# Patient Record
Sex: FEMALE | Race: WHITE | NOT HISPANIC OR LATINO | Employment: FULL TIME | URBAN - METROPOLITAN AREA
[De-identification: names, ages, dates, MRNs, and addresses within clinical notes are randomized per-mention and may not be internally consistent; named-entity substitution may affect disease eponyms.]

---

## 2019-04-03 ENCOUNTER — HOSPITAL ENCOUNTER (INPATIENT)
Facility: MEDICAL CENTER | Age: 39
LOS: 3 days | DRG: 833 | End: 2019-04-07
Attending: OBSTETRICS & GYNECOLOGY | Admitting: OBSTETRICS & GYNECOLOGY
Payer: COMMERCIAL

## 2019-04-03 LAB
ABO GROUP BLD: NORMAL
ABO GROUP BLD: NORMAL
ALBUMIN SERPL BCP-MCNC: 3.4 G/DL (ref 3.2–4.9)
ALBUMIN/GLOB SERPL: 1.3 G/DL
ALP SERPL-CCNC: 114 U/L (ref 30–99)
ALT SERPL-CCNC: 11 U/L (ref 2–50)
ANION GAP SERPL CALC-SCNC: 12 MMOL/L (ref 0–11.9)
AST SERPL-CCNC: 13 U/L (ref 12–45)
BASOPHILS # BLD AUTO: 0.3 % (ref 0–1.8)
BASOPHILS # BLD: 0.03 K/UL (ref 0–0.12)
BILIRUB SERPL-MCNC: 0.3 MG/DL (ref 0.1–1.5)
BLD GP AB INVEST PLASRBC-IMP: NORMAL
BLD GP AB SCN SERPL QL: NORMAL
BUN SERPL-MCNC: 9 MG/DL (ref 8–22)
CALCIUM SERPL-MCNC: 8 MG/DL (ref 8.5–10.5)
CHLORIDE SERPL-SCNC: 103 MMOL/L (ref 96–112)
CO2 SERPL-SCNC: 20 MMOL/L (ref 20–33)
COMPONENT R 8504R: NORMAL
COMPONENT R 8504R: NORMAL
CREAT SERPL-MCNC: 0.65 MG/DL (ref 0.5–1.4)
EOSINOPHIL # BLD AUTO: 0.03 K/UL (ref 0–0.51)
EOSINOPHIL NFR BLD: 0.3 % (ref 0–6.9)
ERYTHROCYTE [DISTWIDTH] IN BLOOD BY AUTOMATED COUNT: 42.7 FL (ref 35.9–50)
GLOBULIN SER CALC-MCNC: 2.6 G/DL (ref 1.9–3.5)
GLUCOSE SERPL-MCNC: 99 MG/DL (ref 65–99)
HCT VFR BLD AUTO: 39.7 % (ref 37–47)
HGB BLD-MCNC: 13.3 G/DL (ref 12–16)
IMM GRANULOCYTES # BLD AUTO: 0.04 K/UL (ref 0–0.11)
IMM GRANULOCYTES NFR BLD AUTO: 0.3 % (ref 0–0.9)
LYMPHOCYTES # BLD AUTO: 1.97 K/UL (ref 1–4.8)
LYMPHOCYTES NFR BLD: 16.4 % (ref 22–41)
MAGNESIUM SERPL-MCNC: 3.5 MG/DL (ref 1.5–2.5)
MCH RBC QN AUTO: 29 PG (ref 27–33)
MCHC RBC AUTO-ENTMCNC: 33.5 G/DL (ref 33.6–35)
MCV RBC AUTO: 86.7 FL (ref 81.4–97.8)
MONOCYTES # BLD AUTO: 0.63 K/UL (ref 0–0.85)
MONOCYTES NFR BLD AUTO: 5.3 % (ref 0–13.4)
NEUTROPHILS # BLD AUTO: 9.3 K/UL (ref 2–7.15)
NEUTROPHILS NFR BLD: 77.4 % (ref 44–72)
NRBC # BLD AUTO: 0 K/UL
NRBC BLD-RTO: 0 /100 WBC
PLATELET # BLD AUTO: 206 K/UL (ref 164–446)
PMV BLD AUTO: 10.1 FL (ref 9–12.9)
POTASSIUM SERPL-SCNC: 3.9 MMOL/L (ref 3.6–5.5)
PROT SERPL-MCNC: 6 G/DL (ref 6–8.2)
RBC # BLD AUTO: 4.58 M/UL (ref 4.2–5.4)
RH BLD: NORMAL
RH BLD: NORMAL
SODIUM SERPL-SCNC: 135 MMOL/L (ref 135–145)
WBC # BLD AUTO: 12 K/UL (ref 4.8–10.8)
XXX BLOOD GROUP AB TITR SERPL AHG: 4 {TITER}

## 2019-04-03 PROCEDURE — 86850 RBC ANTIBODY SCREEN: CPT

## 2019-04-03 PROCEDURE — 86870 RBC ANTIBODY IDENTIFICATION: CPT

## 2019-04-03 PROCEDURE — 86922 COMPATIBILITY TEST ANTIGLOB: CPT

## 2019-04-03 PROCEDURE — 700111 HCHG RX REV CODE 636 W/ 250 OVERRIDE (IP): Performed by: OBSTETRICS & GYNECOLOGY

## 2019-04-03 PROCEDURE — 86900 BLOOD TYPING SEROLOGIC ABO: CPT

## 2019-04-03 PROCEDURE — 700105 HCHG RX REV CODE 258: Performed by: OBSTETRICS & GYNECOLOGY

## 2019-04-03 PROCEDURE — 36415 COLL VENOUS BLD VENIPUNCTURE: CPT

## 2019-04-03 PROCEDURE — 86886 COOMBS TEST INDIRECT TITER: CPT

## 2019-04-03 PROCEDURE — 96375 TX/PRO/DX INJ NEW DRUG ADDON: CPT

## 2019-04-03 PROCEDURE — 86901 BLOOD TYPING SEROLOGIC RH(D): CPT

## 2019-04-03 PROCEDURE — 700111 HCHG RX REV CODE 636 W/ 250 OVERRIDE (IP)

## 2019-04-03 PROCEDURE — 96365 THER/PROPH/DIAG IV INF INIT: CPT

## 2019-04-03 PROCEDURE — 83735 ASSAY OF MAGNESIUM: CPT

## 2019-04-03 PROCEDURE — 85025 COMPLETE CBC W/AUTO DIFF WBC: CPT

## 2019-04-03 PROCEDURE — 700105 HCHG RX REV CODE 258

## 2019-04-03 PROCEDURE — 96372 THER/PROPH/DIAG INJ SC/IM: CPT

## 2019-04-03 PROCEDURE — 80053 COMPREHEN METABOLIC PANEL: CPT

## 2019-04-03 RX ORDER — VITAMIN A ACETATE, BETA CAROTENE, ASCORBIC ACID, CHOLECALCIFEROL, .ALPHA.-TOCOPHEROL ACETATE, DL-, THIAMINE MONONITRATE, RIBOFLAVIN, NIACINAMIDE, PYRIDOXINE HYDROCHLORIDE, FOLIC ACID, CYANOCOBALAMIN, CALCIUM CARBONATE, FERROUS FUMARATE, ZINC OXIDE, CUPRIC OXIDE 3080; 12; 120; 400; 1; 1.84; 3; 20; 22; 920; 25; 200; 27; 10; 2 [IU]/1; UG/1; MG/1; [IU]/1; MG/1; MG/1; MG/1; MG/1; MG/1; [IU]/1; MG/1; MG/1; MG/1; MG/1; MG/1
1 TABLET, FILM COATED ORAL DAILY
Status: DISCONTINUED | OUTPATIENT
Start: 2019-04-04 | End: 2019-04-07 | Stop reason: HOSPADM

## 2019-04-03 RX ORDER — SODIUM CHLORIDE, SODIUM LACTATE, POTASSIUM CHLORIDE, CALCIUM CHLORIDE 600; 310; 30; 20 MG/100ML; MG/100ML; MG/100ML; MG/100ML
INJECTION, SOLUTION INTRAVENOUS CONTINUOUS
Status: DISCONTINUED | OUTPATIENT
Start: 2019-04-03 | End: 2019-04-07 | Stop reason: HOSPADM

## 2019-04-03 RX ORDER — DOCUSATE SODIUM 100 MG/1
100 CAPSULE, LIQUID FILLED ORAL 2 TIMES DAILY
Status: DISCONTINUED | OUTPATIENT
Start: 2019-04-03 | End: 2019-04-07 | Stop reason: HOSPADM

## 2019-04-03 RX ORDER — BETAMETHASONE SODIUM PHOSPHATE AND BETAMETHASONE ACETATE 3; 3 MG/ML; MG/ML
12 INJECTION, SUSPENSION INTRA-ARTICULAR; INTRALESIONAL; INTRAMUSCULAR; SOFT TISSUE EVERY 24 HOURS
Status: COMPLETED | OUTPATIENT
Start: 2019-04-03 | End: 2019-04-04

## 2019-04-03 RX ORDER — MAGNESIUM SULFATE HEPTAHYDRATE 40 MG/ML
2 INJECTION, SOLUTION INTRAVENOUS CONTINUOUS
Status: DISCONTINUED | OUTPATIENT
Start: 2019-04-03 | End: 2019-04-05

## 2019-04-03 RX ORDER — SODIUM CHLORIDE, SODIUM LACTATE, POTASSIUM CHLORIDE, CALCIUM CHLORIDE 600; 310; 30; 20 MG/100ML; MG/100ML; MG/100ML; MG/100ML
INJECTION, SOLUTION INTRAVENOUS
Status: COMPLETED
Start: 2019-04-03 | End: 2019-04-03

## 2019-04-03 RX ORDER — MAGNESIUM SULFATE HEPTAHYDRATE 40 MG/ML
INJECTION, SOLUTION INTRAVENOUS
Status: COMPLETED
Start: 2019-04-03 | End: 2019-04-03

## 2019-04-03 RX ADMIN — MAGNESIUM SULFATE IN WATER 3 G/HR: 40 INJECTION, SOLUTION INTRAVENOUS at 20:00

## 2019-04-03 RX ADMIN — MAGNESIUM SULFATE HEPTAHYDRATE 3 G/HR: 40 INJECTION, SOLUTION INTRAVENOUS at 20:00

## 2019-04-03 RX ADMIN — SODIUM CHLORIDE 2.5 MILLION UNITS: 9 INJECTION, SOLUTION INTRAVENOUS at 21:38

## 2019-04-03 RX ADMIN — SODIUM CHLORIDE, POTASSIUM CHLORIDE, SODIUM LACTATE AND CALCIUM CHLORIDE: 600; 310; 30; 20 INJECTION, SOLUTION INTRAVENOUS at 20:00

## 2019-04-03 RX ADMIN — BETAMETHASONE SODIUM PHOSPHATE AND BETAMETHASONE ACETATE 12 MG: 3; 3 INJECTION, SUSPENSION INTRA-ARTICULAR; INTRALESIONAL; INTRAMUSCULAR at 21:17

## 2019-04-03 ASSESSMENT — PATIENT HEALTH QUESTIONNAIRE - PHQ9
SUM OF ALL RESPONSES TO PHQ9 QUESTIONS 1 AND 2: 0
2. FEELING DOWN, DEPRESSED, IRRITABLE, OR HOPELESS: NOT AT ALL
1. LITTLE INTEREST OR PLEASURE IN DOING THINGS: NOT AT ALL

## 2019-04-03 ASSESSMENT — LIFESTYLE VARIABLES: EVER_SMOKED: NEVER

## 2019-04-04 LAB
MAGNESIUM SERPL-MCNC: 5.5 MG/DL (ref 1.5–2.5)
MAGNESIUM SERPL-MCNC: 5.8 MG/DL (ref 1.5–2.5)
MAGNESIUM SERPL-MCNC: 6.6 MG/DL (ref 1.5–2.5)
MAGNESIUM SERPL-MCNC: 6.8 MG/DL (ref 1.5–2.5)

## 2019-04-04 PROCEDURE — 700105 HCHG RX REV CODE 258: Performed by: OBSTETRICS & GYNECOLOGY

## 2019-04-04 PROCEDURE — A9270 NON-COVERED ITEM OR SERVICE: HCPCS | Performed by: OBSTETRICS & GYNECOLOGY

## 2019-04-04 PROCEDURE — 36415 COLL VENOUS BLD VENIPUNCTURE: CPT

## 2019-04-04 PROCEDURE — 83735 ASSAY OF MAGNESIUM: CPT | Mod: 91

## 2019-04-04 PROCEDURE — 700112 HCHG RX REV CODE 229: Performed by: OBSTETRICS & GYNECOLOGY

## 2019-04-04 PROCEDURE — 96376 TX/PRO/DX INJ SAME DRUG ADON: CPT

## 2019-04-04 PROCEDURE — 700111 HCHG RX REV CODE 636 W/ 250 OVERRIDE (IP): Performed by: OBSTETRICS & GYNECOLOGY

## 2019-04-04 PROCEDURE — 700102 HCHG RX REV CODE 250 W/ 637 OVERRIDE(OP): Performed by: OBSTETRICS & GYNECOLOGY

## 2019-04-04 PROCEDURE — 770002 HCHG ROOM/CARE - OB PRIVATE (112)

## 2019-04-04 PROCEDURE — 96375 TX/PRO/DX INJ NEW DRUG ADDON: CPT

## 2019-04-04 PROCEDURE — 96366 THER/PROPH/DIAG IV INF ADDON: CPT

## 2019-04-04 RX ORDER — ACETAMINOPHEN 325 MG/1
650 TABLET ORAL EVERY 4 HOURS PRN
Status: DISCONTINUED | OUTPATIENT
Start: 2019-04-04 | End: 2019-04-07 | Stop reason: HOSPADM

## 2019-04-04 RX ADMIN — SODIUM CHLORIDE, POTASSIUM CHLORIDE, SODIUM LACTATE AND CALCIUM CHLORIDE: 600; 310; 30; 20 INJECTION, SOLUTION INTRAVENOUS at 14:51

## 2019-04-04 RX ADMIN — SODIUM CHLORIDE 2.5 MILLION UNITS: 9 INJECTION, SOLUTION INTRAVENOUS at 20:25

## 2019-04-04 RX ADMIN — ACETAMINOPHEN 650 MG: 325 TABLET, FILM COATED ORAL at 09:36

## 2019-04-04 RX ADMIN — SODIUM CHLORIDE 2.5 MILLION UNITS: 9 INJECTION, SOLUTION INTRAVENOUS at 11:14

## 2019-04-04 RX ADMIN — MAGNESIUM SULFATE HEPTAHYDRATE 3 G/HR: 40 INJECTION, SOLUTION INTRAVENOUS at 21:35

## 2019-04-04 RX ADMIN — Medication 1 TABLET: at 06:03

## 2019-04-04 RX ADMIN — SODIUM CHLORIDE 2.5 MILLION UNITS: 9 INJECTION, SOLUTION INTRAVENOUS at 02:20

## 2019-04-04 RX ADMIN — SODIUM CHLORIDE 2.5 MILLION UNITS: 9 INJECTION, SOLUTION INTRAVENOUS at 16:06

## 2019-04-04 RX ADMIN — DOCUSATE SODIUM 100 MG: 100 CAPSULE, LIQUID FILLED ORAL at 06:00

## 2019-04-04 RX ADMIN — ACETAMINOPHEN 650 MG: 325 TABLET, FILM COATED ORAL at 17:22

## 2019-04-04 RX ADMIN — FENTANYL CITRATE 50 MCG: 50 INJECTION INTRAMUSCULAR; INTRAVENOUS at 04:36

## 2019-04-04 RX ADMIN — DOCUSATE SODIUM 100 MG: 100 CAPSULE, LIQUID FILLED ORAL at 18:20

## 2019-04-04 RX ADMIN — SODIUM CHLORIDE 2.5 MILLION UNITS: 9 INJECTION, SOLUTION INTRAVENOUS at 06:04

## 2019-04-04 RX ADMIN — ACETAMINOPHEN 650 MG: 325 TABLET, FILM COATED ORAL at 02:31

## 2019-04-04 RX ADMIN — ACETAMINOPHEN 650 MG: 325 TABLET, FILM COATED ORAL at 21:25

## 2019-04-04 RX ADMIN — BETAMETHASONE SODIUM PHOSPHATE AND BETAMETHASONE ACETATE 12 MG: 3; 3 INJECTION, SUSPENSION INTRA-ARTICULAR; INTRALESIONAL; INTRAMUSCULAR at 21:26

## 2019-04-04 RX ADMIN — MAGNESIUM SULFATE HEPTAHYDRATE 3 G/HR: 40 INJECTION, SOLUTION INTRAVENOUS at 09:01

## 2019-04-04 NOTE — PROGRESS NOTES
0210- Assumed care of pt from ADAL Hicks.     0220- Dr. Srinivasan called, pt requesting tylenol for headache. Order given for 650 mg of tylenol prn.     0350- Pt requesting SVE and epidural. Dr. Srinivasan called for order. Per MD orders given for SVE, call MD back if cervical change is present. POC discussed with pt and the reason for why an epidural is discouraged at this point. POC for pt is to hopefully receive second dose of betamethasone. If pt goes into labor we will not intervene to stop labor but goal is to delay labor until second dose of steroids can be given. Pt agreeable to POC. Stated she now has a better understanding of POC.     3037- MD called with results of SVE. Orders given for Fentanyl 50 mcg if need for pain. Hold off on epidural for now.    0700- SBAR given Swapna RN.

## 2019-04-04 NOTE — PROGRESS NOTES
LEIF 2019    GA  34w6d hilliard IUP.    1945- pt arrived via [facility] from [facility].. TOCO/EFM pulse ox applied. Per pt. Office visit SVE 2-3 cm. Was advised to go to hospital for UCs. Upon arrival at hospital, SVE 4cm and hiram. Dr. Srinivasan accepted care of pt. Reports +FM, and intermittent UCs. Denies VB or LOF. GBS was swabbed at [facility]. Results pending.   - Magnesium sulfate orders 3g/hr,  ok to not have Rodriguez.  - Dr. Srinivasan at bedside for full assessment and anatomy scan.    -  betamethasone injection given. See MAR.   0210- report given to ELO Quan

## 2019-04-04 NOTE — CARE PLAN
Problem: Safety  Goal: Free from accidental injury  Outcome: PROGRESSING AS EXPECTED  Pt educated to call for assistance with ambulation, pt remains free from falls

## 2019-04-04 NOTE — PROGRESS NOTES
"0700 - Received report from ADAL Avila  0750 - Lab called, new mag level is 5.8 (See results review)  0945 - ROYER Srinivasan at bedside to check in with pt. Pt questions and concerns answered and addressed regarding fetal clubfoot finding.   1000 - Pt received breakfast tray and is \"snacking\"   1445 - lab called, new mag level 6.6 (See results review)  1525 - Called to report to Dr. Srinivasan, pt reporting increased back pain, breaking through this pain every 5 minutes, but no UC's picked up in the TOCO, Dr. Srinivasan asked RN to perform SVE, 5/70/-2  1900 - Gave report to ADAL Mcknight     "

## 2019-04-04 NOTE — H&P
DATE OF ADMISSION:  2019    REFERRING INSTITUTION:  [facility].    REASON FOR TRANSFER:   labor.    HISTORY OF PRESENT ILLNESS:  A 39-year-old , EDC 2019, currently at   34 weeks and 6 days, transferred for spontaneous onset of labor.  Patient   reports that she was having some difficulty with standing due to pelvic   pressure.  She had a routine OB visit today, reported these symptoms, was   noted to be 2-3 cm dilated and subsequently found to be actively hiram.    Transfer was requested and initiated.  IV mag sulfate was started as well as   IV penicillin.  Patient arrived safely without incident.    PAST MEDICAL HISTORY:  She denies any major medical problems including asthma,   seizures, hypertension, cardiovascular, GI or  diseases.    OPERATIONS:  None.    TRANSFUSIONS:  None.    ALLERGIES:  None.    VENEREAL DISEASE HISTORY:  Negative.    HABITS:  None.    MEDICATIONS:  None.    PAST OBSTETRICAL HISTORY:  In , 37-week male, 5 pounds 10 ounces, , no   complications.    PHYSICAL EXAMINATION:  GENERAL:  Well-developed, well-nourished female, alert and oriented x3.  VITAL SIGNS:  All within normal limits.  HEAD:  Normocephalic.  EYES:  No scleral icterus or subconjunctival pallor.  Pupils equal, reactive   to light and accommodation.  Extraocular movements symmetrical.  EARS, NOSE AND THROAT:  Grossly within normal limits.  LUNGS:  Clear.  HEART:  Regular rate and rhythm.  Normal S1 and S2.  No S3, S4 or murmurs.  ABDOMEN:  Soft, gravid uterus.  EXTREMITIES:  No edema or varicosities.    IMAGING:  Bedside ultrasound reveals hilliard fetus, vertex.  Estimated fetal   weight 2706 g, JOEL of 15.9, male.    ASSESSMENT:  1.  Intrauterine pregnancy at 34 weeks and 6 days.  2.   labor.    PLAN:  Stabilization with IV magnesium sulfate and complete corticosteroids.    She will receive penicillin for GBS prophylaxis pending culture.    We discussed the purposes of steroids  and the goal is to stabilize the patient   and complete steroids.  Thereafter, if labor should occur, we will offer   vaginal birth.    We discussed the issues of prematurity.  All questions answered to her   Satisfaction.    Time:  90 minutes       ____________________________________     MD TITO JAMES / SIMONE    DD:  04/03/2019 23:56:25  DT:  04/04/2019 00:17:40    D#:  [id number]  Job#:  [id number]

## 2019-04-04 NOTE — CARE PLAN
Problem: Risk for Infection, Impaired Wound Healing  Goal: Remain free from signs and symptoms of infection  Outcome: PROGRESSING AS EXPECTED  Pt free from s/s of infection       gradual onset

## 2019-04-05 LAB
MAGNESIUM SERPL-MCNC: 6.1 MG/DL (ref 1.5–2.5)
MAGNESIUM SERPL-MCNC: 6.7 MG/DL (ref 1.5–2.5)
MAGNESIUM SERPL-MCNC: 7.2 MG/DL (ref 1.5–2.5)

## 2019-04-05 PROCEDURE — A9270 NON-COVERED ITEM OR SERVICE: HCPCS | Performed by: OBSTETRICS & GYNECOLOGY

## 2019-04-05 PROCEDURE — 700111 HCHG RX REV CODE 636 W/ 250 OVERRIDE (IP): Performed by: OBSTETRICS & GYNECOLOGY

## 2019-04-05 PROCEDURE — 700102 HCHG RX REV CODE 250 W/ 637 OVERRIDE(OP): Performed by: OBSTETRICS & GYNECOLOGY

## 2019-04-05 PROCEDURE — 700105 HCHG RX REV CODE 258: Performed by: OBSTETRICS & GYNECOLOGY

## 2019-04-05 PROCEDURE — 36415 COLL VENOUS BLD VENIPUNCTURE: CPT

## 2019-04-05 PROCEDURE — 770002 HCHG ROOM/CARE - OB PRIVATE (112)

## 2019-04-05 PROCEDURE — 700112 HCHG RX REV CODE 229: Performed by: OBSTETRICS & GYNECOLOGY

## 2019-04-05 PROCEDURE — 83735 ASSAY OF MAGNESIUM: CPT | Mod: 91

## 2019-04-05 RX ORDER — CALCIUM CARBONATE 500 MG/1
500 TABLET, CHEWABLE ORAL DAILY
Status: DISCONTINUED | OUTPATIENT
Start: 2019-04-06 | End: 2019-04-07 | Stop reason: HOSPADM

## 2019-04-05 RX ADMIN — MAGNESIUM SULFATE HEPTAHYDRATE 2 G/HR: 40 INJECTION, SOLUTION INTRAVENOUS at 11:07

## 2019-04-05 RX ADMIN — SODIUM CHLORIDE 2.5 MILLION UNITS: 9 INJECTION, SOLUTION INTRAVENOUS at 21:26

## 2019-04-05 RX ADMIN — ACETAMINOPHEN 650 MG: 325 TABLET, FILM COATED ORAL at 15:47

## 2019-04-05 RX ADMIN — SODIUM CHLORIDE, POTASSIUM CHLORIDE, SODIUM LACTATE AND CALCIUM CHLORIDE: 600; 310; 30; 20 INJECTION, SOLUTION INTRAVENOUS at 11:52

## 2019-04-05 RX ADMIN — ACETAMINOPHEN 650 MG: 325 TABLET, FILM COATED ORAL at 11:16

## 2019-04-05 RX ADMIN — SODIUM CHLORIDE 2.5 MILLION UNITS: 9 INJECTION, SOLUTION INTRAVENOUS at 17:16

## 2019-04-05 RX ADMIN — ACETAMINOPHEN 650 MG: 325 TABLET, FILM COATED ORAL at 22:17

## 2019-04-05 RX ADMIN — ACETAMINOPHEN 650 MG: 325 TABLET, FILM COATED ORAL at 01:26

## 2019-04-05 RX ADMIN — SODIUM CHLORIDE 2.5 MILLION UNITS: 9 INJECTION, SOLUTION INTRAVENOUS at 12:59

## 2019-04-05 RX ADMIN — SODIUM CHLORIDE 2.5 MILLION UNITS: 9 INJECTION, SOLUTION INTRAVENOUS at 09:05

## 2019-04-05 RX ADMIN — SODIUM CHLORIDE 2.5 MILLION UNITS: 9 INJECTION, SOLUTION INTRAVENOUS at 04:36

## 2019-04-05 RX ADMIN — Medication 1 TABLET: at 08:38

## 2019-04-05 RX ADMIN — DOCUSATE SODIUM 100 MG: 100 CAPSULE, LIQUID FILLED ORAL at 18:00

## 2019-04-05 RX ADMIN — SODIUM CHLORIDE 2.5 MILLION UNITS: 9 INJECTION, SOLUTION INTRAVENOUS at 00:19

## 2019-04-05 RX ADMIN — DOCUSATE SODIUM 100 MG: 100 CAPSULE, LIQUID FILLED ORAL at 08:38

## 2019-04-05 NOTE — PROGRESS NOTES
1900) Report received from BRAYDON Lopez RN and TRUDY Alberts RN.  Pt assessed, assisted to commode, POC discussed  2125) Tylenol given for right sided back pain. Heat applied, position changed.   2126) Betamethasone dose #2 given.  Pt feels occasional cramping, abdomen palpates soft, TOCO adjusted.  No leaking of fluid or bleeding.     2230) Pt resting  0000) Pt position adjusted, ice packs given for right sided back pain  0125) Medication see MAR  0215) Pt sleeping  0630) Pt assisted to commode, eugene pads changed.  New ice packs applied.   0645) Report to BRAYDON Lopez RN and KATEY Lomax RN.  Pt sleeping

## 2019-04-05 NOTE — PROGRESS NOTES
S: Feeling some pressure.  O: Afebrile       EFM: reactive.  Irregular contractions       Cervix at 5 cm  A:  IUP 35 weeks         labor  P:   D/C magnesium sulfate when steroids are done.    Time: 15 minutes

## 2019-04-05 NOTE — CARE PLAN
Problem: Pain  Goal: Alleviation of Pain or a reduction in pain to the patient's comfort goal  Outcome: PROGRESSING AS EXPECTED  Pt desires epidural when in active labor    Problem: Risk for Infection, Impaired Wound Healing  Goal: Remain free from signs and symptoms of infection  Outcome: PROGRESSING AS EXPECTED  Pt remains free from s/s of infection

## 2019-04-05 NOTE — PROGRESS NOTES
0700 - Took report from ADAL Mcknight. Patient sleeping comfortably   0845 - Lab called with critical Magnesium Sulfate result of 7.2. Critical lab result read back to lab. Dr. Srinivasan notified of critical lab result at 0846.  Critical lab result read back by Dr. Srinivasan. Order received to decrease Magnesium Sulfate rate (See MAR). POC to d/c Magnesium Sulfate at 48 hours after initial BMZ dose given.  1459 - Isabelle from Lab called with critical Magnesium Sulfate result of 6.1. Critical lab result read back to Isabelle. This critical lab result is within parameters established by  for this patient.  1820 - Orders received from  to d/c Magnesium Sulfate at 2100.  1900 - Gave report to AIMEE Moser RN

## 2019-04-06 PROCEDURE — 700112 HCHG RX REV CODE 229: Performed by: OBSTETRICS & GYNECOLOGY

## 2019-04-06 PROCEDURE — 700105 HCHG RX REV CODE 258: Performed by: OBSTETRICS & GYNECOLOGY

## 2019-04-06 PROCEDURE — 700111 HCHG RX REV CODE 636 W/ 250 OVERRIDE (IP): Performed by: OBSTETRICS & GYNECOLOGY

## 2019-04-06 PROCEDURE — 302790 HCHG STAT ANTEPARTUM CARE, DAILY

## 2019-04-06 PROCEDURE — A9270 NON-COVERED ITEM OR SERVICE: HCPCS | Performed by: OBSTETRICS & GYNECOLOGY

## 2019-04-06 PROCEDURE — 770002 HCHG ROOM/CARE - OB PRIVATE (112)

## 2019-04-06 PROCEDURE — 700102 HCHG RX REV CODE 250 W/ 637 OVERRIDE(OP): Performed by: OBSTETRICS & GYNECOLOGY

## 2019-04-06 RX ADMIN — SODIUM CHLORIDE 2.5 MILLION UNITS: 9 INJECTION, SOLUTION INTRAVENOUS at 05:04

## 2019-04-06 RX ADMIN — Medication 1 TABLET: at 07:29

## 2019-04-06 RX ADMIN — SODIUM CHLORIDE 2.5 MILLION UNITS: 9 INJECTION, SOLUTION INTRAVENOUS at 01:17

## 2019-04-06 RX ADMIN — ACETAMINOPHEN 650 MG: 325 TABLET, FILM COATED ORAL at 05:15

## 2019-04-06 RX ADMIN — DOCUSATE SODIUM 100 MG: 100 CAPSULE, LIQUID FILLED ORAL at 06:00

## 2019-04-06 RX ADMIN — ANTACID TABLETS 500 MG: 500 TABLET, CHEWABLE ORAL at 07:29

## 2019-04-06 RX ADMIN — DOCUSATE SODIUM 100 MG: 100 CAPSULE, LIQUID FILLED ORAL at 18:00

## 2019-04-06 NOTE — PROGRESS NOTES
1855-Report received from TAZ Ceja RN. Pt resting comfortably in bed, POC discussed, all questions answered.   2130-Phoned Dr. Srinivasan, provided pt update, orders to turn off magnesium (see MAR)  0200-Spoke with Dr. Srinivasan in department, orders to saline lock IV, regular diet, and allow pt to ambulate with bathroom privileges   0230-Report given to ELO Aviles RN

## 2019-04-06 NOTE — CARE PLAN
Problem: Pain  Goal: Alleviation of Pain or a reduction in pain to the patient's comfort goal  Outcome: PROGRESSING AS EXPECTED  Pt denies pain at this time. Pain POC discussed, will want an epidural when in active labor.    Problem: Risk for Infection, Impaired Wound Healing  Goal: Remain free from signs and symptoms of infection  Outcome: PROGRESSING AS EXPECTED  Pt afebrile, no s/s of infection

## 2019-04-06 NOTE — PROGRESS NOTES
0700 Report received from Kalpana GARCIA RN, POC discussed, assumed pt care. Pt in room 216 with FOB Kevin. 39 y.o.  EDC 19 EGA 35.2 post steroids, Magnesium stopped last night . Last SVE /-2. GBS was collected at [facility] on Wed in [place]. Requested [facility] unit clerk to inquire for results.     0745 Pt up to shower  NO contractions, no cramping, Denies vaginal bleeding or LOF.     1330 Dr. Srinivasan at bedside. Discussed possible discharge to [facility] here in [place] if pt does not go into labor. Pt to get up and ambulate and will continue to monitor and assess for change in pt status.     1400 Pt up walking halls.

## 2019-04-06 NOTE — PROGRESS NOTES
S: No complaints.  O: AFebrile       EFM: occasional contractions.  Baseline 140 bpm, reactive, moderate variability  A: IUP 35 1/7 weeks       Completed steroids tonight  P: D/C magnesium sulfate tonight    Time: 15 minutes

## 2019-04-07 VITALS
WEIGHT: 187 LBS | DIASTOLIC BLOOD PRESSURE: 66 MMHG | RESPIRATION RATE: 20 BRPM | HEIGHT: 65 IN | OXYGEN SATURATION: 96 % | BODY MASS INDEX: 31.16 KG/M2 | SYSTOLIC BLOOD PRESSURE: 111 MMHG | HEART RATE: 80 BPM | TEMPERATURE: 97.8 F

## 2019-04-07 PROBLEM — O60.00 PRETERM LABOR: Status: ACTIVE | Noted: 2019-04-07

## 2019-04-07 PROCEDURE — A9270 NON-COVERED ITEM OR SERVICE: HCPCS | Performed by: OBSTETRICS & GYNECOLOGY

## 2019-04-07 PROCEDURE — 700102 HCHG RX REV CODE 250 W/ 637 OVERRIDE(OP): Performed by: OBSTETRICS & GYNECOLOGY

## 2019-04-07 PROCEDURE — 700112 HCHG RX REV CODE 229: Performed by: OBSTETRICS & GYNECOLOGY

## 2019-04-07 RX ADMIN — Medication 1 TABLET: at 05:38

## 2019-04-07 RX ADMIN — ACETAMINOPHEN 650 MG: 325 TABLET, FILM COATED ORAL at 05:38

## 2019-04-07 RX ADMIN — ANTACID TABLETS 500 MG: 500 TABLET, CHEWABLE ORAL at 05:38

## 2019-04-07 RX ADMIN — DOCUSATE SODIUM 100 MG: 100 CAPSULE, LIQUID FILLED ORAL at 05:38

## 2019-04-07 NOTE — PROGRESS NOTES
S: no complaints.  Irregular contractions.  O: Afebrile       EFM:  Baseline:  140 bpm.  Moderate variability, accelerations present.                  Irregular contractions  A: IUP 35 3/7 weeks       labor at 5 cm  P:  Discharge today.  Follow up Dr. Silvestre on Thursday.     Time: 15 minutes

## 2019-04-07 NOTE — DISCHARGE INSTRUCTIONS
General Instructions:  · If you think you are in labor, time contractions (lying on your left side) from the beginning of one contraction to the beginning of the next contraction for at least one hour.  · Increase fluid intake: you should consume 10-12 8 oz glasses of non-caffeinated fluid per day.  · Report any pressure or burning on urination to your physician.  · Monitor fetal movement: If you notice an absence or decrease in fetal movement, drink a large glass of water and rest on your side.  If there is no increase in movement, call your physician or go to the hospital for further evaluation.  · Report any sudden, sharp abdominal pain.  · Report any bleeding.  Spotting or pinkish discharge is normal after vaginal exam.  You may also spot after sexual intercourse.    Labor Instructions :  Call your physician or return to hospital if:  · You have regular contractions that get progressively closer, longer and stronger.  · Your water breaks (remember time and color).  · You have bleeding like a period.  · Your baby does not move enough to complete the daily kick counts (10 movements in 2 hours)  · Your baby moves much less often than on the days before or you have not felt your baby move all day.      Other Instructions:  Please carefully review your entire AFTER VISIT SUMMARY document for all discharge instructions.

## 2019-04-07 NOTE — DISCHARGE SUMMARY
DATE OF DISCHARGE:  2019    ADMISSION DIAGNOSES:  1.  Intrauterine pregnancy 34 weeks and 6 days.  2.   labor.    DISCHARGE DIAGNOSES:  1.  Intrauterine pregnancy 35 weeks and 3 days.  2.   labor.  3.  Rh negative.    PROCEDURE RECEIVED:  Magnesium sulfate and corticosteroids.    COMPLICATIONS:  None.    BRIEF HISTORY:  A 39-year-old G2, P1, EDC , admitted at 34 weeks and 6   days, was transfer from [facility] for  labor.  IV mag   sulfate was initiated and IV penicillin and betamethasone.    HOSPITAL COURSE:  Patient arrived and was examined and found to be 2-3 cm   dilated.  Patient was actively hiram.  Bedside ultrasound reveals   hilliard fetus, vertex, estimated fetal weight 2706 g.  JOEL 15.9, male fetus.    During the hospitalization, patient stabilized on mag sulfate, completed   corticosteroid therapy.  GBS subsequently reported to be negative and   magnesium sulfate was discontinued after steroids.    Patient remained stable.  There was some cervical change estimated to be 4-5   cm and patient has remained stable and was able to be discharged today.    Because of the gestational age, she will be staying with relatives in the [facility] until she is 36 weeks and then will return to [place] with   intent of delivery in [place].      Patient has done well and no medications.      DIET:  Regular.    ACTIVITY:  As tolerated.    PATIENT INSTRUCTIONS:  Should she have recurrence of uterine contractions, she   may return to [facility] if she is in the [facility] or report to [facility] if   she is in [facility].       ____________________________________     MD TITO JAMES / SIMONE    DD:  2019 10:20:27  DT:  2019 12:54:40    D#:  [id number]  Job#:  [id number]

## 2019-04-07 NOTE — CARE PLAN
Problem: Pain  Goal: Alleviation of Pain or a reduction in pain to the patient's comfort goal  Outcome: PROGRESSING AS EXPECTED  Patient denies any pain.states she feels occasional contractions.  Patient educated to notify RN if any pain or contractions increase.    Problem: Risk for Infection, Impaired Wound Healing  Goal: Remain free from signs and symptoms of infection  Outcome: PROGRESSING AS EXPECTED  Patient remains afebrile. No S&S of infections

## 2019-04-07 NOTE — PROGRESS NOTES
1730 Pt transferred to room 230 for observation throughout the night.   1800 RN called to pt room. Pt reports increased contractions with pelvic pressure. RN requested Pt to get up to void. Report to Dr. Srinivasan. If pt continues to contract with increased frequency and intensity to check cervix.   1900 Report to NOC RN.

## 2019-04-07 NOTE — PROGRESS NOTES
0700-Report received from YOLANDA Krause RN. .POC discussed, pt verbalized understanding. Patient is a  edc  making her 35.3 weeks.    0830- Patient denies any regular contractions. States she feels occasional contractions, but have not increased in intensity over the day and night. Denies any leaking of any fluid or any vaginal bleeding. States positive fetal movement. All questions answered, no further needs at this time. Assessment complete.     1200-  Patient given labor precautions and to continue with kick counts. Patient states understanding of when to return to L&D. Patient will be spending the next three days in [place] with family. Patient has an appt with her OB in [place] on Thursday. IV discontinued. Patient discharged home with her mother in stable condition.

## 2019-04-07 NOTE — CARE PLAN
Problem: Pain  Goal: Alleviation of Pain or a reduction in pain to the patient's comfort goal  Outcome: PROGRESSING AS EXPECTED  Pt tolerating UCs. Will request epidural for pain management if labor progresses.    Problem: Risk for Infection, Impaired Wound Healing  Goal: Remain free from signs and symptoms of infection  Outcome: PROGRESSING AS EXPECTED  Pt is afebrile.    Problem: Risk for injury  Goal: Patient and fetus will be free of preventable injury/complications  Outcome: PROGRESSING AS EXPECTED  EFM and TOCO in place to monitor fetal well being and uterine activity.

## 2019-04-07 NOTE — PROGRESS NOTES
1900- Report received from ELO Jhaveri RN. Pt resting in bed, eating dinner. Family at bedside. POC discussed.     1915- Pt assessment. Reports +FM and UCs. Denies LOF, VB, pain.    0015- Regular UCs on TOCO. Pt reports feeling an occasional strong UC but not every UC on strip. Declines SVE at this time.     0530- Pt requesting SVE- 5/70%/-2. Unchanged from previous SVE on 4/4.    0700- Report given to ELO Lopes RN. POC discussed.

## 2022-01-06 ENCOUNTER — APPOINTMENT (RX ONLY)
Dept: URBAN - METROPOLITAN AREA CLINIC 38 | Facility: CLINIC | Age: 42
Setting detail: DERMATOLOGY
End: 2022-01-06

## 2022-01-06 DIAGNOSIS — L82.1 OTHER SEBORRHEIC KERATOSIS: ICD-10-CM

## 2022-01-06 DIAGNOSIS — Z71.89 OTHER SPECIFIED COUNSELING: ICD-10-CM

## 2022-01-06 DIAGNOSIS — D18.0 HEMANGIOMA: ICD-10-CM

## 2022-01-06 DIAGNOSIS — D22 MELANOCYTIC NEVI: ICD-10-CM

## 2022-01-06 DIAGNOSIS — I73.00 RAYNAUD'S SYNDROME WITHOUT GANGRENE: ICD-10-CM

## 2022-01-06 DIAGNOSIS — L81.4 OTHER MELANIN HYPERPIGMENTATION: ICD-10-CM

## 2022-01-06 PROBLEM — D22.9 MELANOCYTIC NEVI, UNSPECIFIED: Status: ACTIVE | Noted: 2022-01-06

## 2022-01-06 PROBLEM — D18.01 HEMANGIOMA OF SKIN AND SUBCUTANEOUS TISSUE: Status: ACTIVE | Noted: 2022-01-06

## 2022-01-06 PROCEDURE — ? COUNSELING

## 2022-01-06 PROCEDURE — 99203 OFFICE O/P NEW LOW 30 MIN: CPT

## 2022-01-06 ASSESSMENT — LOCATION ZONE DERM
LOCATION ZONE: TRUNK
LOCATION ZONE: ARM
LOCATION ZONE: FINGER
LOCATION ZONE: TOE

## 2022-01-06 ASSESSMENT — LOCATION DETAILED DESCRIPTION DERM
LOCATION DETAILED: LEFT DISTAL DORSAL FOREARM
LOCATION DETAILED: RIGHT INFERIOR UPPER BACK
LOCATION DETAILED: LEFT DORSAL 2ND TOE
LOCATION DETAILED: PERIUMBILICAL SKIN
LOCATION DETAILED: LEFT DISTAL PALMAR INDEX FINGER
LOCATION DETAILED: RIGHT DISTAL DORSAL FOREARM

## 2022-01-06 ASSESSMENT — LOCATION SIMPLE DESCRIPTION DERM
LOCATION SIMPLE: LEFT 2ND TOE
LOCATION SIMPLE: RIGHT FOREARM
LOCATION SIMPLE: LEFT INDEX FINGER
LOCATION SIMPLE: LEFT FOREARM
LOCATION SIMPLE: RIGHT UPPER BACK
LOCATION SIMPLE: ABDOMEN

## 2023-06-20 NOTE — PROGRESS NOTES
S: no complaints  O: Afebrile       EFM:  Baseline:  140 bpm.  Moderate variability, accelerations present.                  Irregular contractions  A: IUP 35 2/7 weeks       labor at 5 cm  P:  Ambulate patient if stable will discharge in am tomorrow    Time: 15 minutes   severe